# Patient Record
Sex: MALE | URBAN - METROPOLITAN AREA
[De-identification: names, ages, dates, MRNs, and addresses within clinical notes are randomized per-mention and may not be internally consistent; named-entity substitution may affect disease eponyms.]

---

## 2024-07-01 ENCOUNTER — APPOINTMENT (EMERGENCY)
Dept: RADIOLOGY | Facility: HOSPITAL | Age: 34
End: 2024-07-01

## 2024-07-01 ENCOUNTER — HOSPITAL ENCOUNTER (EMERGENCY)
Facility: HOSPITAL | Age: 34
Discharge: HOME | End: 2024-07-01
Attending: EMERGENCY MEDICINE

## 2024-07-01 VITALS
SYSTOLIC BLOOD PRESSURE: 132 MMHG | TEMPERATURE: 97.5 F | WEIGHT: 147.71 LBS | RESPIRATION RATE: 20 BRPM | HEART RATE: 104 BPM | OXYGEN SATURATION: 93 % | DIASTOLIC BLOOD PRESSURE: 72 MMHG

## 2024-07-01 DIAGNOSIS — M79.604 RIGHT LEG PAIN: ICD-10-CM
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
## 2024-07-01 DIAGNOSIS — F10.929 ALCOHOL INTOXICATION (HCC): ICD-10-CM

## 2024-07-01 PROCEDURE — 99284 EMERGENCY DEPT VISIT MOD MDM: CPT

## 2024-07-01 PROCEDURE — 72170 X-RAY EXAM OF PELVIS: CPT

## 2024-07-01 PROCEDURE — 73552 X-RAY EXAM OF FEMUR 2/>: CPT

## 2024-07-03 NOTE — ED PROVIDER NOTES
History  Chief Complaint   Patient presents with    Fall     Pt was walking and fell 1h pta. Hx of femur fx in R leg 2 years ago in [place]. Denies hitting his head, no LOC. Pt admits to drinking beer.      34 year old male presenting today with EMS, had a fall about an hour prior to arrival.  Patient states that his right leg hurts.  Patient states that he did have a surgery in this area a few years ago in the [place].  Patient states that he does not member any other injuries before that.  Patient states that he has been drinking today.  Denies any other drug use.  Denies any head strike.  Patient does not take any blood thinners.  No nausea, vomiting, diarrhea, constipation, or any other symptoms at this time.        None       History reviewed. No pertinent past medical history.    History reviewed. No pertinent surgical history.    History reviewed. No pertinent family history.  I have reviewed and agree with the history as documented.    E-Cigarette/Vaping     E-Cigarette/Vaping Substances          Review of Systems   Constitutional:  Negative for chills and fever.   HENT:  Negative for ear pain and sore throat.    Eyes:  Negative for pain and visual disturbance.   Respiratory:  Negative for cough and shortness of breath.    Cardiovascular:  Negative for chest pain and palpitations.   Gastrointestinal:  Negative for abdominal pain and vomiting.   Genitourinary:  Negative for dysuria and hematuria.   Musculoskeletal:  Positive for arthralgias. Negative for back pain and joint swelling.   Skin:  Negative for color change and rash.   Neurological:  Negative for seizures and syncope.   All other systems reviewed and are negative.      Physical Exam  Physical Exam  Vitals and nursing note reviewed.   Constitutional:       General: He is not in acute distress.  HENT:      Head: Normocephalic and atraumatic.   Eyes:      General: No scleral icterus.     Conjunctiva/sclera: Conjunctivae normal.      Pupils:  Pupils are equal, round, and reactive to light.   Cardiovascular:      Rate and Rhythm: Normal rate and regular rhythm.      Pulses: Normal pulses.   Pulmonary:      Effort: Pulmonary effort is normal. No respiratory distress.   Abdominal:      Tenderness: There is no abdominal tenderness.   Musculoskeletal:         General: Tenderness (Right lateral midshaft thigh.  No overlying skin changes.  No bruising.  No ecchymosis. No wounds.) present. Normal range of motion.      Cervical back: Normal range of motion.      Right lower leg: No edema.      Left lower leg: No edema.   Skin:     General: Skin is warm and dry.      Capillary Refill: Capillary refill takes less than 2 seconds.      Coloration: Skin is not jaundiced or pale.      Findings: No bruising, erythema, lesion or rash.   Neurological:      Mental Status: He is alert and oriented to person, place, and time.         Vital Signs  ED Triage Vitals [07/01/24 1021]   Temperature Pulse Respirations Blood Pressure SpO2   97.5 °F (36.4 °C) 104 20 132/72 93 %      Temp Source Heart Rate Source Patient Position - Orthostatic VS BP Location FiO2 (%)   Oral Monitor Lying Left arm --      Pain Score       --           Vitals:    07/01/24 1021   BP: 132/72   Pulse: 104   Patient Position - Orthostatic VS: Lying         Visual Acuity      ED Medications  Medications - No data to display    Diagnostic Studies  Results Reviewed       None                   XR femur 2 views RIGHT   ED Interpretation by Abraham Kwong PA-C (07/01 1102)   Hardware in place, callus is well-appearing no acute fractures or dislocations.      Final Result by Bernardino Payne MD (07/02 5311)      No acute fracture.      Intramedullary nail and interlocking screw fixation of a healed femoral mid shaft fracture. The proximal interlocking screw is bent or fractured in the intramedullary nail protrudes from the femoral neck, which can cause impingement. No proximal    migration of the  distal aspect of the nail. Recommend outpatient orthopedic follow-up to assess hardware.      The study was marked in EPIC for immediate notification.         Computerized Assisted Algorithm (CAA) may have been used to analyze all applicable images.         Workstation performed: [id number]         XR pelvis ap only 1 or 2 vw   ED Interpretation by Abraham Kwong PA-C (07/01 1102)   Hardware in place, callus is well-appearing no acute fractures or dislocations.        Final Result by Bernardino Payne MD (07/02 0941)      No acute fracture.      Intramedullary nail and interlocking screw fixation of a healed femoral mid shaft fracture. The proximal interlocking screw is bent or fractured in the intramedullary nail protrudes from the femoral neck, which can cause impingement. No proximal    migration of the distal aspect of the nail. Recommend outpatient orthopedic follow-up to assess hardware.      The study was marked in EPIC for immediate notification.         Computerized Assisted Algorithm (CAA) may have been used to analyze all applicable images.         Workstation performed: [id number]                    Procedures  Procedures         ED Course                                             Medical Decision Making  34 year old male presenting today with concerns of fall, right leg pain.  XR to rule out fracture.  XR reviewed by me, please see above documentation.  Patient ambulatory.  About 45 minutes after patient's arrival, patient requesting to leave because he feels better.  Discharge with close return precautions and follow up with orthopedics for further evaluation.  ------------------------------------------------------------  Strict return precautions discussed. Patient at time of discharge well-appearing in no acute distress, all questions answered. Patient agreeable to plan.  Patient's vitals, lab/imaging results, diagnosis, and treatment plan were discussed with the patient. All  "new/changed medications were discussed with patient, specifically, route of administration, how often and when to take, and where they can be picked up. Strict return precautions as well as close follow up with PCP was discussed with the patient and the patient was agreeable to my recommendations.  Patient verbally acknowledged understanding of the above communications. All labs reviewed and utilized in the medical decision making process (if labs were ordered). Portions of the record may have been created with voice recognition software.  Occasional wrong word or \"sound a like\" substitutions may have occurred due to the inherent limitations of voice recognition software.  Read the chart carefully and recognize, using context, where substitutions have occurred.      Amount and/or Complexity of Data Reviewed  Radiology: ordered and independent interpretation performed.             Disposition  Final diagnoses:   Fall   Right leg pain   Alcohol intoxication (HCC)     Time reflects when diagnosis was documented in both MDM as applicable and the Disposition within this note       Time User Action Codes Description Comment    7/1/2024 11:05 AM Abraham Kwong [W19.XXXA] Fall     7/1/2024 11:05 AM Abraham Kwong [M79.604] Right leg pain     7/1/2024 11:06 AM Abraham Kwong [F10.929] Alcohol intoxication (HCC)           ED Disposition       ED Disposition   Discharge    Condition   Stable    Date/Time   Mon Jul 1, 2024 11:05 AM    Comment   Gee Alvares discharge to home/self care.                   Follow-up Information       Follow up With Specialties Details Why Contact Info Additional Information    [facility] Emergency Medicine Go to  If symptoms worsen [address] [facility] [address] [phone] [facility]            There are no discharge medications for this patient.      No discharge procedures " on file.    PDMP Review       None            ED Provider  Electronically Signed by             Abraham Kwong PA-C  07/03/24 0323